# Patient Record
Sex: FEMALE | Race: WHITE
[De-identification: names, ages, dates, MRNs, and addresses within clinical notes are randomized per-mention and may not be internally consistent; named-entity substitution may affect disease eponyms.]

---

## 2019-06-24 ENCOUNTER — HOSPITAL ENCOUNTER (OUTPATIENT)
Dept: HOSPITAL 83 - US | Age: 34
Discharge: HOME | End: 2019-06-24
Attending: OBSTETRICS & GYNECOLOGY
Payer: COMMERCIAL

## 2019-06-24 DIAGNOSIS — N93.9: Primary | ICD-10-CM

## 2019-06-24 DIAGNOSIS — N88.8: ICD-10-CM

## 2023-02-16 ENCOUNTER — OFFICE VISIT (OUTPATIENT)
Dept: OBGYN | Age: 38
End: 2023-02-16
Payer: COMMERCIAL

## 2023-02-16 VITALS
HEART RATE: 93 BPM | DIASTOLIC BLOOD PRESSURE: 76 MMHG | SYSTOLIC BLOOD PRESSURE: 116 MMHG | WEIGHT: 186 LBS | HEIGHT: 63 IN | BODY MASS INDEX: 32.96 KG/M2

## 2023-02-16 DIAGNOSIS — N93.9 ABNORMAL UTERINE BLEEDING (AUB): Primary | ICD-10-CM

## 2023-02-16 DIAGNOSIS — B97.7 HPV IN FEMALE: ICD-10-CM

## 2023-02-16 PROCEDURE — 99203 OFFICE O/P NEW LOW 30 MIN: CPT | Performed by: OBSTETRICS & GYNECOLOGY

## 2023-02-16 PROCEDURE — 1036F TOBACCO NON-USER: CPT | Performed by: OBSTETRICS & GYNECOLOGY

## 2023-02-16 PROCEDURE — G8427 DOCREV CUR MEDS BY ELIG CLIN: HCPCS | Performed by: OBSTETRICS & GYNECOLOGY

## 2023-02-16 PROCEDURE — G8417 CALC BMI ABV UP PARAM F/U: HCPCS | Performed by: OBSTETRICS & GYNECOLOGY

## 2023-02-16 PROCEDURE — G8484 FLU IMMUNIZE NO ADMIN: HCPCS | Performed by: OBSTETRICS & GYNECOLOGY

## 2023-02-16 RX ORDER — FLUOXETINE HYDROCHLORIDE 20 MG/1
CAPSULE ORAL
COMMUNITY
Start: 2022-12-01

## 2023-02-16 SDOH — ECONOMIC STABILITY: INCOME INSECURITY: HOW HARD IS IT FOR YOU TO PAY FOR THE VERY BASICS LIKE FOOD, HOUSING, MEDICAL CARE, AND HEATING?: NOT HARD AT ALL

## 2023-02-16 SDOH — ECONOMIC STABILITY: HOUSING INSECURITY
IN THE LAST 12 MONTHS, WAS THERE A TIME WHEN YOU DID NOT HAVE A STEADY PLACE TO SLEEP OR SLEPT IN A SHELTER (INCLUDING NOW)?: NO

## 2023-02-16 SDOH — ECONOMIC STABILITY: FOOD INSECURITY: WITHIN THE PAST 12 MONTHS, THE FOOD YOU BOUGHT JUST DIDN'T LAST AND YOU DIDN'T HAVE MONEY TO GET MORE.: NEVER TRUE

## 2023-02-16 SDOH — ECONOMIC STABILITY: FOOD INSECURITY: WITHIN THE PAST 12 MONTHS, YOU WORRIED THAT YOUR FOOD WOULD RUN OUT BEFORE YOU GOT MONEY TO BUY MORE.: NEVER TRUE

## 2023-02-16 ASSESSMENT — PATIENT HEALTH QUESTIONNAIRE - PHQ9
SUM OF ALL RESPONSES TO PHQ QUESTIONS 1-9: 0
2. FEELING DOWN, DEPRESSED OR HOPELESS: 0
SUM OF ALL RESPONSES TO PHQ QUESTIONS 1-9: 0
SUM OF ALL RESPONSES TO PHQ QUESTIONS 1-9: 0
SUM OF ALL RESPONSES TO PHQ9 QUESTIONS 1 & 2: 0
1. LITTLE INTEREST OR PLEASURE IN DOING THINGS: 0
SUM OF ALL RESPONSES TO PHQ QUESTIONS 1-9: 0

## 2023-02-16 NOTE — PROGRESS NOTES
Patient is here today as a new patient. Patient complains of heavy and frequent menstrual cycles. Having about two cycles a month.

## 2023-02-16 NOTE — PROGRESS NOTES
HISTORY OF PRESENT ILLNESS:    30 y.o. female  [id number] presents with complaint of AUB. Bleeding q 14-20 days for 4-9 days. Some days are so heavy she has to change her clothes because she bleeds through them. This has been a problem since 2019. Has trialed multiple ocps without relief. Recent US wnl per pt. Thyroid studies and CBC wnl. FSH and LH normal as well. Recent pap was HPV positive, neg cytology. Past Medical History:   Past Medical History:   Diagnosis Date    Abnormal Papanicolaou smear of cervix     Anxiety                                              OB History    Para Term  AB Living   2 2 2     2   SAB IAB Ectopic Molar Multiple Live Births             2      # Outcome Date GA Lbr Estrada/2nd Weight Sex Delivery Anes PTL Lv   2 Term 18   8 lb 3 oz (3.714 kg)  CS-LTranv   CARINA   1 Term 13   8 lb 3 oz (3.714 kg)  CS-LTranv   CARINA         Past Surgical History:   Past Surgical History:   Procedure Laterality Date    CERVIX SURGERY      cryo    CERVIX SURGERY      C     SECTION      x2    DILATION AND CURETTAGE OF UTERUS      TUBAL LIGATION          Allergies: Patient has no known allergies. Medications:   Current Outpatient Medications   Medication Sig Dispense Refill    FLUoxetine (PROZAC) 20 MG capsule        No current facility-administered medications for this visit.          Social History:   Social History     Tobacco Use    Smoking status: Never    Smokeless tobacco: Never   Substance Use Topics    Alcohol use: Not Currently        Family History:   Family History   Problem Relation Age of Onset    Hypertension Father     Stroke Father         due to HTN       REVIEW OF SYSTEMS:    Constitutional: negative  HEENT: negative  Breast: negative  Respiratory: negative  Cardiovascular: negative  Gastrointestinal: negative  Genitourinary: As per HPI  Integument: negative  Neurological: negative  Endocrine: negative          PHYSICAL EXAM  /76   Pulse 93   Ht 5' 3\" (1.6 m)   Wt 186 lb (84.4 kg)   BMI 32.95 kg/m²   No LMP recorded.      General appearance: alert, cooperative and in no acute distress.  Head: NCAT   Psych: No acute distress, mood and affect full range  Neuro: Alert and oriented, no focal deficits       ASSESSMENT :      Diagnosis Orders   1. Abnormal uterine bleeding (AUB)  Prolactin      2. HPV in female             PLAN:  Offered depo and IUD. Also discussed surgical options of ablation vs hysterectomy. Discussed that she is not a great candidate for ablation due to young age. Pt would like to think about options and get back to us. Plan for colposcopy. Also need to get US report.  Return in about 4 weeks (around 3/16/2023).    No orders of the defined types were placed in this encounter.      Orders Placed This Encounter   Procedures    Prolactin     Standing Status:   Future     Standing Expiration Date:   2/16/2024           Electronically signed by Gabby Price DO on 2/16/23

## 2023-03-16 ENCOUNTER — OFFICE VISIT (OUTPATIENT)
Dept: OBGYN | Age: 38
End: 2023-03-16
Payer: COMMERCIAL

## 2023-03-16 VITALS
BODY MASS INDEX: 33.39 KG/M2 | DIASTOLIC BLOOD PRESSURE: 83 MMHG | HEART RATE: 81 BPM | SYSTOLIC BLOOD PRESSURE: 126 MMHG | WEIGHT: 188.5 LBS

## 2023-03-16 DIAGNOSIS — B97.7 HPV IN FEMALE: ICD-10-CM

## 2023-03-16 DIAGNOSIS — N93.9 ABNORMAL UTERINE BLEEDING (AUB): Primary | ICD-10-CM

## 2023-03-16 PROCEDURE — 99214 OFFICE O/P EST MOD 30 MIN: CPT | Performed by: OBSTETRICS & GYNECOLOGY

## 2023-03-16 PROCEDURE — 57452 EXAM OF CERVIX W/SCOPE: CPT | Performed by: OBSTETRICS & GYNECOLOGY

## 2023-03-16 RX ORDER — TRANEXAMIC ACID 650 MG/1
1300 TABLET ORAL 3 TIMES DAILY
Qty: 30 TABLET | Refills: 3 | Status: SHIPPED | OUTPATIENT
Start: 2023-03-16 | End: 2023-03-21

## 2023-03-16 NOTE — PROGRESS NOTES
Here today for colposcopy. Instructed on the procedure, voiced understanding and permit signed. Prepared for procedure. Time out done by DR. Robson Humphreys  prior to starting the procedure. Tolerated procedure. No bleeding noted after procedure. No biopsy obtained. Discharge instructions reviewed verbally and written AVS given to patient. Voiced understanding and agreement.   No baths, tampons, intercourse and nothing in the vagina for 48 hours

## 2023-03-16 NOTE — PROGRESS NOTES
Colposcopy Procedure Note    Indications: Pap smear showed: HPV positive, normal cytology  Significant history: Cyrosurgery, [facility] 2009    Procedure Details   The risks and benefits of the procedure was explained and informed consent obtained. The vulva, vagina, and perianal regions were inspected and without gross abnormalities. The speculum was placed in the vagina and excellent visualization of cervix was achieved. The cervix was swabbed copiously with acetic acid solution. Lugol's solution was applied. Findings:  Cervix:   Normal cervical epithelium, nabothian cyst x2  Vaginal inspection: No lesions    Specimens: None    Complications: none. Plan:  Repeat cotesting 1 year.     Vera Francisco DO

## 2023-03-16 NOTE — PROGRESS NOTES
HISTORY OF PRESENT ILLNESS:    Pt presents for follow up for AUB. Bleeding q 14-20 days for 4-9 days. Some days are so heavy she has to change her clothes because she bleeds through them. This has been a problem since 2019. Has trialed multiple ocps without relief. Recent US wnl. Thyroid studies and CBC wnl. FSH and LH normal as well. Prolactin not yet done. Pap showed HPV positive, normal cytology    Past Medical History:   Past Medical History:   Diagnosis Date    Abnormal Papanicolaou smear of cervix     Anxiety                                              OB History    Para Term  AB Living   2 2 2     2   SAB IAB Ectopic Molar Multiple Live Births             2      # Outcome Date GA Lbr Estrada/2nd Weight Sex Delivery Anes PTL Lv   2 Term 18   8 lb 3 oz (3.714 kg)  CS-LTranv   CARINA   1 Term 13   8 lb 3 oz (3.714 kg)  CS-LTranv   CARINA         Past Surgical History:   Past Surgical History:   Procedure Laterality Date    CERVIX SURGERY      cryo    CERVIX SURGERY      [facility]     SECTION      x2    DILATION AND CURETTAGE OF UTERUS      TUBAL LIGATION          Allergies: Patient has no known allergies. Medications:   Current Outpatient Medications   Medication Sig Dispense Refill    tranexamic acid (LYSTEDA) 650 MG TABS tablet Take 2 tablets by mouth 3 times daily for 5 days 30 tablet 3    FLUoxetine (PROZAC) 20 MG capsule        No current facility-administered medications for this visit.          Social History:   Social History     Tobacco Use    Smoking status: Never    Smokeless tobacco: Never   Substance Use Topics    Alcohol use: Not Currently        Family History:   Family History   Problem Relation Age of Onset    Hypertension Father     Stroke Father         due to HTN       REVIEW OF SYSTEMS:    Constitutional: negative  HEENT: negative  Breast: negative  Respiratory: negative  Cardiovascular: negative  Gastrointestinal: negative  Genitourinary:negative  Integument: negative  Neurological: negative  Endocrine: negative          PHYSICAL EXAM  /83 (Position: Sitting)   Pulse 81   Wt 188 lb 8 oz (85.5 kg)   LMP 03/08/2023   BMI 33.39 kg/m²   Patient's last menstrual period was 03/08/2023. General appearance: alert, cooperative and in no acute distress. Head: NCAT   Psych: No acute distress, mood and affect full range  Neuro: Alert and oriented, no focal deficits          ASSESSMENT :   Diagnosis Orders   1. Abnormal uterine bleeding (AUB)        2. HPV in female             PLAN:  Pt does not want to try any hormonal methods. Had a lot of depression with previous ocps where she tried multiple ones. She would like to try lysteda. Return in about 3 months (around 6/16/2023) for Medication check. Orders Placed This Encounter   Medications    tranexamic acid (LYSTEDA) 650 MG TABS tablet     Sig: Take 2 tablets by mouth 3 times daily for 5 days     Dispense:  30 tablet     Refill:  3       No orders of the defined types were placed in this encounter.         Electronically signed by Anival Matthews DO on 3/16/23